# Patient Record
(demographics unavailable — no encounter records)

---

## 2024-12-10 NOTE — HISTORY OF PRESENT ILLNESS
[FreeTextEntry1] : 50-year-old male presents to follow-up for low testosterone, ED. previous patient of Dr. Escobar  On daily cialis 5mg daily for ED. Works well. Reports sex life is good.  in June Previously on testosterone replacement.  Last testosterone December 2023: 348  PSA trend: November 2020: 1.69 December 2023: 3.27 December 2023: 3.04 MRI prostate January 2024: 46cc prostate, PSA density 0.07, PI-RADS 1  Reports was taking a testosterone boosting supplement last winter, and has since stopped

## 2024-12-10 NOTE — ASSESSMENT
[FreeTextEntry1] : 50 y.o. M with ED  #ED - Cialis resumed, 5mg / day - Works welll  #PSA screenig - PSA today - s/p MRI 1/2024 - 46cc prostate, PSA density 0.07 - no high risk features  f/u annual exam

## 2025-04-25 NOTE — HISTORY OF PRESENT ILLNESS
[FreeTextEntry1] : 49M with hx of pAF, dilated aortic root, presents for f/u PMD: Dr Jimmie Shaikh  Previously pt seen in cardiology 12/2020 for an initial eval. At that time, pt was feeling well overall, exercising regular, going walking 4-5 miles, several times a week without symptoms. pt followed up with cardio-genetics, had genetic panel done which did not reveal any obvious contributing findings. pt had a repeat echo 6/21 to assess for interval change of the aorta which revealed no significant change from prev echo in 11/2020. Aortic sinus measuring at 4.4cm 7/2021, with mild CP. sent for calcium score which was 17. pt has not been seen here since 7/2021, last seen 2/23, feeling well. repeat echo showing Ao 4.4 cm pt missed appt 11/23  pt has not been here since 2/23 pt now presents for follow up. today,  pt feeling better, has lost 50 lbs over past 6 months, on semiglutide. states he completely changed his eating habits. walking regularly.  blood pressure has improved. off amlodipine for past few months.   denies cp sob palpitations, dizziness, syncope. LE edema. denisa has improved. pt states "this is the best i have felt in years"  Prior cardiac workup: TTE 11/2020: normal LV global function, aortic sinus 1.3 cm, ascending aorta 4.1 cm Recent labs: 11/2020: a1c 5.3 tsh 2.03 tot chol 186 tg 124 hdl 53 dl 108 Cr 0.88   EKG: SR 75  Med hx: paroxysmal AF, last episode 8 years ago. Low testosterone Sx hx: back sx x3. hand sx, eye sx. Fam hx: F: CVAs, first was age 40. B: aortic aneurysm. 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 never tob, drinks bourbon, whiskey and makes his own craft beer, denies drug use. Meds: none Allergies: nkda

## 2025-04-25 NOTE — DISCUSSION/SUMMARY
[FreeTextEntry1] : 49M with hx of pAF, dilated aortic root, presents for f/u  pAF -in SR today -asymptomatic  dilated aortic root -last eval 2023 -will repeat tte and aaa study today for surveillance  -pending results will consider need for bb  HTN -improved with weight loss -pt of Norvasc -will monitor off meds for now   f/u 6 months unless requires [id number] min spent on complete encounter [EKG obtained to assist in diagnosis and management of assessed problem(s)] : EKG obtained to assist in diagnosis and management of assessed problem(s)